# Patient Record
Sex: FEMALE | Race: WHITE | NOT HISPANIC OR LATINO | Employment: OTHER | URBAN - METROPOLITAN AREA
[De-identification: names, ages, dates, MRNs, and addresses within clinical notes are randomized per-mention and may not be internally consistent; named-entity substitution may affect disease eponyms.]

---

## 2021-03-08 ENCOUNTER — IMMUNIZATION (OUTPATIENT)
Dept: FAMILY MEDICINE | Facility: CLINIC | Age: 76
End: 2021-03-08
Payer: MEDICARE

## 2021-03-08 DIAGNOSIS — Z23 NEED FOR VACCINATION: Primary | ICD-10-CM

## 2021-03-08 PROCEDURE — 91301 COVID-19, MRNA, LNP-S, PF, 100 MCG/0.5 ML DOSE VACCINE: ICD-10-PCS | Mod: S$GLB,,, | Performed by: FAMILY MEDICINE

## 2021-03-08 PROCEDURE — 0011A COVID-19, MRNA, LNP-S, PF, 100 MCG/0.5 ML DOSE VACCINE: ICD-10-PCS | Mod: CV19,S$GLB,, | Performed by: FAMILY MEDICINE

## 2021-03-08 PROCEDURE — 0011A COVID-19, MRNA, LNP-S, PF, 100 MCG/0.5 ML DOSE VACCINE: CPT | Mod: CV19,S$GLB,, | Performed by: FAMILY MEDICINE

## 2021-03-08 PROCEDURE — 91301 COVID-19, MRNA, LNP-S, PF, 100 MCG/0.5 ML DOSE VACCINE: CPT | Mod: S$GLB,,, | Performed by: FAMILY MEDICINE

## 2021-04-05 ENCOUNTER — IMMUNIZATION (OUTPATIENT)
Dept: FAMILY MEDICINE | Facility: CLINIC | Age: 76
End: 2021-04-05
Payer: MEDICARE

## 2021-04-05 DIAGNOSIS — Z23 NEED FOR VACCINATION: Primary | ICD-10-CM

## 2021-04-05 PROCEDURE — 91301 COVID-19, MRNA, LNP-S, PF, 100 MCG/0.5 ML DOSE VACCINE: ICD-10-PCS | Mod: S$GLB,,, | Performed by: FAMILY MEDICINE

## 2021-04-05 PROCEDURE — 0012A COVID-19, MRNA, LNP-S, PF, 100 MCG/0.5 ML DOSE VACCINE: CPT | Mod: CV19,S$GLB,, | Performed by: FAMILY MEDICINE

## 2021-04-05 PROCEDURE — 0012A COVID-19, MRNA, LNP-S, PF, 100 MCG/0.5 ML DOSE VACCINE: ICD-10-PCS | Mod: CV19,S$GLB,, | Performed by: FAMILY MEDICINE

## 2021-04-05 PROCEDURE — 91301 COVID-19, MRNA, LNP-S, PF, 100 MCG/0.5 ML DOSE VACCINE: CPT | Mod: S$GLB,,, | Performed by: FAMILY MEDICINE

## 2021-11-17 NOTE — PROGRESS NOTES
"Chief Complaint   Patient presents with    Establish Care           Patient is new to clinic, here to establish care.   Current complaints/concerns:  *headache-   *memory issue- loses time, crying, waxing mood, more forgetful; worsening over the last 6-12 months  *doesn't live alone, lives with son and teenage granddaughter  *      Headache   This is a chronic problem. The current episode started more than 1 year ago. The pain is located in the occipital region. The quality of the pain is described as aching and throbbing. Associated symptoms include neck pain.         Review of patient's allergies indicates:   Allergen Reactions    Codeine Itching and Other (See Comments)    Pyrilamine-phenylephrin-dm tan Itching    Adhesive Other (See Comments)    Adhesive tape-silicones        No current outpatient medications on file prior to visit.     No current facility-administered medications on file prior to visit.       History reviewed. No pertinent past medical history.   Past Surgical History:   Procedure Laterality Date    bladder tacking      CHOLECYSTECTOMY      GASTRIC BYPASS      HYSTERECTOMY      TOTAL VAGINAL HYSTERECTOMY         Social History     Tobacco Use    Smoking status: Never Smoker    Smokeless tobacco: Never Used   Substance Use Topics    Alcohol use: Never    Drug use: Never        Family History   Problem Relation Age of Onset    Heart disease Mother     Diabetes Mother     Cancer Father         throat cancer        Review of Systems   Musculoskeletal: Positive for myalgias, neck pain and neck stiffness.   Neurological: Positive for headaches.   Psychiatric/Behavioral:        Memory issues        /84 (BP Location: Right arm, Patient Position: Sitting, BP Method: Medium (Automatic))   Pulse 75   Ht 5' 2" (1.575 m)   Wt 64.8 kg (142 lb 13.7 oz)   SpO2 95%   BMI 26.13 kg/m²     Physical Exam  Vitals and nursing note reviewed. Exam conducted with a chaperone present (daughter " of patient).   Constitutional:       Appearance: Normal appearance.   HENT:      Head: Normocephalic and atraumatic.   Eyes:      Conjunctiva/sclera: Conjunctivae normal.      Pupils: Pupils are equal, round, and reactive to light.   Cardiovascular:      Rate and Rhythm: Normal rate and regular rhythm.      Heart sounds: Normal heart sounds. No murmur heard.      Pulmonary:      Effort: Pulmonary effort is normal. No respiratory distress.      Breath sounds: Normal breath sounds and air entry. No stridor. No decreased breath sounds, wheezing, rhonchi or rales.   Musculoskeletal:      Cervical back: Spasms present. Decreased range of motion.        Back:    Neurological:      Mental Status: She is alert and oriented to person, place, and time.      Motor: Motor function is intact.      Coordination: Coordination is intact.      Gait: Gait is intact.   Psychiatric:         Attention and Perception: Attention and perception normal.         Mood and Affect: Mood normal. Affect is flat.         Speech: Speech normal.         Behavior: Behavior normal. Behavior is cooperative.         Thought Content: Thought content normal.         Judgment: Judgment normal.            Assessment and Plan (Medical Justification)      Yamilex was seen today for establish care.    Diagnoses and all orders for this visit:    Muscle spasm  -     methocarbamoL (ROBAXIN) 500 MG Tab; Take 1 tablet (500 mg total) by mouth 4 (four) times daily.  -     tiZANidine (ZANAFLEX) 2 MG tablet; One-half tablet to one whole tablet by mouth at bedtime    Memory difficulties  Comments:  unclear eitology         Follow up in about 4 weeks (around 12/16/2021) for New symptoms, Worsening symptoms.       Total time spent:  45 min    Available Notes, Procedures and Results, including Labs/Imaging, from the last 3 months were reviewed.    Risks, benefits, and side effects were discussed with the patient. All questions were answered to the fullest satisfaction of the  patient, and pt verbalized understanding and agreement to treatment plan. Pt was to call with any new or worsening symptoms, or present to the ER.    Patient instructed that best way to communicate with my office staff is for patient to get on the [facility] patient portal to expedite communication and communication issues that may occur.  Patient was given instructions on how to get on the portal.  I encouraged patient to obtain portal access as well.  Ultimately it is up to the patient to obtain access.  Patient voiced understanding.          Gabriele Cabrera M.D.  Family Medicine Physician  [facility]

## 2021-11-18 ENCOUNTER — OFFICE VISIT (OUTPATIENT)
Dept: FAMILY MEDICINE | Facility: CLINIC | Age: 76
End: 2021-11-18
Payer: MEDICARE

## 2021-11-18 VITALS
DIASTOLIC BLOOD PRESSURE: 84 MMHG | BODY MASS INDEX: 26.29 KG/M2 | HEART RATE: 75 BPM | OXYGEN SATURATION: 95 % | HEIGHT: 62 IN | WEIGHT: 142.88 LBS | SYSTOLIC BLOOD PRESSURE: 122 MMHG

## 2021-11-18 DIAGNOSIS — M62.838 MUSCLE SPASM: Primary | ICD-10-CM

## 2021-11-18 DIAGNOSIS — R41.3 MEMORY DIFFICULTIES: ICD-10-CM

## 2021-11-18 PROCEDURE — 3288F PR FALLS RISK ASSESSMENT DOCUMENTED: ICD-10-PCS | Mod: CPTII,S$GLB,, | Performed by: FAMILY MEDICINE

## 2021-11-18 PROCEDURE — 99204 OFFICE O/P NEW MOD 45 MIN: CPT | Mod: S$GLB,,, | Performed by: FAMILY MEDICINE

## 2021-11-18 PROCEDURE — 99999 PR PBB SHADOW E&M-EST. PATIENT-LVL III: ICD-10-PCS | Mod: PBBFAC,,, | Performed by: FAMILY MEDICINE

## 2021-11-18 PROCEDURE — 1101F PR PT FALLS ASSESS DOC 0-1 FALLS W/OUT INJ PAST YR: ICD-10-PCS | Mod: CPTII,S$GLB,, | Performed by: FAMILY MEDICINE

## 2021-11-18 PROCEDURE — 3008F BODY MASS INDEX DOCD: CPT | Mod: CPTII,S$GLB,, | Performed by: FAMILY MEDICINE

## 2021-11-18 PROCEDURE — 1126F PR PAIN SEVERITY QUANTIFIED, NO PAIN PRESENT: ICD-10-PCS | Mod: CPTII,S$GLB,, | Performed by: FAMILY MEDICINE

## 2021-11-18 PROCEDURE — 3074F SYST BP LT 130 MM HG: CPT | Mod: CPTII,S$GLB,, | Performed by: FAMILY MEDICINE

## 2021-11-18 PROCEDURE — 3008F PR BODY MASS INDEX (BMI) DOCUMENTED: ICD-10-PCS | Mod: CPTII,S$GLB,, | Performed by: FAMILY MEDICINE

## 2021-11-18 PROCEDURE — 3079F DIAST BP 80-89 MM HG: CPT | Mod: CPTII,S$GLB,, | Performed by: FAMILY MEDICINE

## 2021-11-18 PROCEDURE — 1160F PR REVIEW ALL MEDS BY PRESCRIBER/CLIN PHARMACIST DOCUMENTED: ICD-10-PCS | Mod: CPTII,S$GLB,, | Performed by: FAMILY MEDICINE

## 2021-11-18 PROCEDURE — 99204 PR OFFICE/OUTPT VISIT, NEW, LEVL IV, 45-59 MIN: ICD-10-PCS | Mod: S$GLB,,, | Performed by: FAMILY MEDICINE

## 2021-11-18 PROCEDURE — 99999 PR PBB SHADOW E&M-EST. PATIENT-LVL III: CPT | Mod: PBBFAC,,, | Performed by: FAMILY MEDICINE

## 2021-11-18 PROCEDURE — 3288F FALL RISK ASSESSMENT DOCD: CPT | Mod: CPTII,S$GLB,, | Performed by: FAMILY MEDICINE

## 2021-11-18 PROCEDURE — 1159F MED LIST DOCD IN RCRD: CPT | Mod: CPTII,S$GLB,, | Performed by: FAMILY MEDICINE

## 2021-11-18 PROCEDURE — 1159F PR MEDICATION LIST DOCUMENTED IN MEDICAL RECORD: ICD-10-PCS | Mod: CPTII,S$GLB,, | Performed by: FAMILY MEDICINE

## 2021-11-18 PROCEDURE — 3074F PR MOST RECENT SYSTOLIC BLOOD PRESSURE < 130 MM HG: ICD-10-PCS | Mod: CPTII,S$GLB,, | Performed by: FAMILY MEDICINE

## 2021-11-18 PROCEDURE — 1101F PT FALLS ASSESS-DOCD LE1/YR: CPT | Mod: CPTII,S$GLB,, | Performed by: FAMILY MEDICINE

## 2021-11-18 PROCEDURE — 3079F PR MOST RECENT DIASTOLIC BLOOD PRESSURE 80-89 MM HG: ICD-10-PCS | Mod: CPTII,S$GLB,, | Performed by: FAMILY MEDICINE

## 2021-11-18 PROCEDURE — 1160F RVW MEDS BY RX/DR IN RCRD: CPT | Mod: CPTII,S$GLB,, | Performed by: FAMILY MEDICINE

## 2021-11-18 PROCEDURE — 1126F AMNT PAIN NOTED NONE PRSNT: CPT | Mod: CPTII,S$GLB,, | Performed by: FAMILY MEDICINE

## 2021-11-18 RX ORDER — TIZANIDINE 2 MG/1
TABLET ORAL
Qty: 30 TABLET | Refills: 2 | Status: SHIPPED | OUTPATIENT
Start: 2021-11-18 | End: 2022-08-03

## 2021-11-18 RX ORDER — METHOCARBAMOL 500 MG/1
500 TABLET, FILM COATED ORAL 4 TIMES DAILY
Qty: 120 TABLET | Refills: 1 | Status: SHIPPED | OUTPATIENT
Start: 2021-11-18 | End: 2022-08-03

## 2021-11-24 DIAGNOSIS — Z12.11 COLON CANCER SCREENING: ICD-10-CM

## 2021-11-24 DIAGNOSIS — Z11.59 NEED FOR HEPATITIS C SCREENING TEST: ICD-10-CM

## 2021-12-02 DIAGNOSIS — Z12.31 OTHER SCREENING MAMMOGRAM: ICD-10-CM

## 2022-01-01 PROBLEM — M62.838 MUSCLE SPASM: Status: ACTIVE | Noted: 2022-01-01

## 2022-01-07 ENCOUNTER — TELEPHONE (OUTPATIENT)
Dept: FAMILY MEDICINE | Facility: CLINIC | Age: 77
End: 2022-01-07
Payer: MEDICARE

## 2022-01-07 DIAGNOSIS — R41.3 MEMORY DIFFICULTIES: Primary | ICD-10-CM

## 2022-01-07 NOTE — TELEPHONE ENCOUNTER
----- Message from Atilio Renteria sent at 1/7/2022  2:00 PM CST -----  Contact: Daughter/Kasia  Type: Needs Medical Advice  Who Called:  DaughterIsaias  Best Call Back Number: [phone]  Additional Information: States referral to Neuro discussed with Dr Cabrera for Dementia Screeening during last visit. Calling to follow up

## 2022-01-07 NOTE — TELEPHONE ENCOUNTER
Spoke to daughter. Dr Cabrera was supposed to give her a referral to neuro, but forgot. Daughter is desperate. Mother is combative, getting lost, etc.  Referral pended

## 2022-07-12 ENCOUNTER — TELEPHONE (OUTPATIENT)
Dept: PRIMARY CARE CLINIC | Facility: CLINIC | Age: 77
End: 2022-07-12
Payer: MEDICARE

## 2022-07-12 NOTE — TELEPHONE ENCOUNTER
----- Message from Dominga White, Patient Care Assistant sent at 7/8/2022  8:44 AM CDT -----  Regarding: same day  Contact: karlo white's daughter  Type:  Same Day Appointment Request    Caller is requesting a same day appointment.  Caller declined first available appointment listed below.      Name of Caller: karlo white's daughter   When is the first available appointment?  2022  Symptoms:  UTI  Best Call Back Number:  [phone] (home)     Additional Information: please call karlo white's daughter to advise. Thanks!

## 2022-08-03 ENCOUNTER — OFFICE VISIT (OUTPATIENT)
Dept: FAMILY MEDICINE | Facility: CLINIC | Age: 77
End: 2022-08-03
Payer: MEDICARE

## 2022-08-03 VITALS
OXYGEN SATURATION: 99 % | SYSTOLIC BLOOD PRESSURE: 128 MMHG | RESPIRATION RATE: 19 BRPM | HEIGHT: 62 IN | BODY MASS INDEX: 25.05 KG/M2 | DIASTOLIC BLOOD PRESSURE: 75 MMHG | WEIGHT: 136.13 LBS | TEMPERATURE: 99 F | HEART RATE: 85 BPM

## 2022-08-03 DIAGNOSIS — E53.8 VITAMIN B12 DEFICIENCY: Primary | ICD-10-CM

## 2022-08-03 DIAGNOSIS — R41.3 MEMORY DIFFICULTIES: ICD-10-CM

## 2022-08-03 PROCEDURE — 1126F PR PAIN SEVERITY QUANTIFIED, NO PAIN PRESENT: ICD-10-PCS | Mod: CPTII,S$GLB,, | Performed by: INTERNAL MEDICINE

## 2022-08-03 PROCEDURE — 99999 PR PBB SHADOW E&M-EST. PATIENT-LVL III: CPT | Mod: PBBFAC,,, | Performed by: INTERNAL MEDICINE

## 2022-08-03 PROCEDURE — 96372 THER/PROPH/DIAG INJ SC/IM: CPT | Mod: S$GLB,,, | Performed by: INTERNAL MEDICINE

## 2022-08-03 PROCEDURE — 99999 PR PBB SHADOW E&M-EST. PATIENT-LVL III: ICD-10-PCS | Mod: PBBFAC,,, | Performed by: INTERNAL MEDICINE

## 2022-08-03 PROCEDURE — 3078F DIAST BP <80 MM HG: CPT | Mod: CPTII,S$GLB,, | Performed by: INTERNAL MEDICINE

## 2022-08-03 PROCEDURE — 3074F PR MOST RECENT SYSTOLIC BLOOD PRESSURE < 130 MM HG: ICD-10-PCS | Mod: CPTII,S$GLB,, | Performed by: INTERNAL MEDICINE

## 2022-08-03 PROCEDURE — 1126F AMNT PAIN NOTED NONE PRSNT: CPT | Mod: CPTII,S$GLB,, | Performed by: INTERNAL MEDICINE

## 2022-08-03 PROCEDURE — 99213 OFFICE O/P EST LOW 20 MIN: CPT | Mod: 25,S$GLB,, | Performed by: INTERNAL MEDICINE

## 2022-08-03 PROCEDURE — 96372 PR INJECTION,THERAP/PROPH/DIAG2ST, IM OR SUBCUT: ICD-10-PCS | Mod: S$GLB,,, | Performed by: INTERNAL MEDICINE

## 2022-08-03 PROCEDURE — 3078F PR MOST RECENT DIASTOLIC BLOOD PRESSURE < 80 MM HG: ICD-10-PCS | Mod: CPTII,S$GLB,, | Performed by: INTERNAL MEDICINE

## 2022-08-03 PROCEDURE — 99213 PR OFFICE/OUTPT VISIT, EST, LEVL III, 20-29 MIN: ICD-10-PCS | Mod: 25,S$GLB,, | Performed by: INTERNAL MEDICINE

## 2022-08-03 PROCEDURE — 3074F SYST BP LT 130 MM HG: CPT | Mod: CPTII,S$GLB,, | Performed by: INTERNAL MEDICINE

## 2022-08-03 RX ORDER — RIVASTIGMINE 9.5 MG/24H
1 PATCH, EXTENDED RELEASE TRANSDERMAL
COMMUNITY
Start: 2022-01-18 | End: 2023-01-23

## 2022-08-03 RX ORDER — RISPERIDONE 0.5 MG/1
TABLET ORAL
COMMUNITY
End: 2023-01-23

## 2022-08-03 RX ORDER — PAROXETINE HYDROCHLORIDE HEMIHYDRATE 12.5 MG/1
12.5 TABLET, FILM COATED, EXTENDED RELEASE ORAL EVERY MORNING
Qty: 30 TABLET | Refills: 11 | Status: SHIPPED | OUTPATIENT
Start: 2022-08-03 | End: 2023-01-23

## 2022-08-03 RX ORDER — CYANOCOBALAMIN 1000 UG/ML
1000 INJECTION, SOLUTION INTRAMUSCULAR; SUBCUTANEOUS WEEKLY
Status: SHIPPED | OUTPATIENT
Start: 2022-08-03 | End: 2022-08-31

## 2022-08-03 RX ADMIN — CYANOCOBALAMIN 1000 MCG: 1000 INJECTION, SOLUTION INTRAMUSCULAR; SUBCUTANEOUS at 04:08

## 2022-08-03 NOTE — PROGRESS NOTES
Subjective:       Patient ID: Yamilex Gaytan is a 77 y.o. female.    Chief Complaint: Medication Problem    FU, Crying, Dementia,    Review of Systems   Constitutional: Negative for activity change, appetite change, diaphoresis, fatigue, fever and unexpected weight change.   HENT: Negative for nasal congestion, dental problem, ear discharge, ear pain, hearing loss, mouth dryness, postnasal drip, rhinorrhea, sinus pressure/congestion, sore throat and tinnitus.    Eyes: Negative for pain, redness and visual disturbance.   Respiratory: Negative for cough, choking, chest tightness, shortness of breath and wheezing.    Cardiovascular: Negative for chest pain, palpitations and leg swelling.   Gastrointestinal: Negative for abdominal distention, abdominal pain, blood in stool, nausea and reflux.   Endocrine: Negative for cold intolerance, heat intolerance and polyuria.   Genitourinary: Negative for bladder incontinence, dysuria, frequency, genital sores and hot flashes.   Musculoskeletal: Negative for back pain, joint swelling and neck pain.   Integumentary:  Negative for rash, mole/lesion and breast mass.   Allergic/Immunologic: Negative for food allergies.   Neurological: Negative for dizziness, tremors, seizures, weakness, headaches and memory loss.   Hematological: Negative for adenopathy.   Psychiatric/Behavioral: Negative for behavioral problems and suicidal ideas.   Breast: Negative for mass        Objective:      Physical Exam  Vitals and nursing note reviewed.   Constitutional:       Appearance: Normal appearance. She is normal weight.   HENT:      Head: Normocephalic and atraumatic.      Right Ear: Tympanic membrane, ear canal and external ear normal.      Left Ear: Tympanic membrane, ear canal and external ear normal.      Nose: Nose normal.      Mouth/Throat:      Mouth: Mucous membranes are moist.      Pharynx: Oropharynx is clear.   Eyes:      Extraocular Movements: Extraocular movements intact.       Conjunctiva/sclera: Conjunctivae normal.      Pupils: Pupils are equal, round, and reactive to light.   Cardiovascular:      Rate and Rhythm: Normal rate and regular rhythm.      Pulses: Normal pulses.      Heart sounds: Normal heart sounds.   Pulmonary:      Effort: Pulmonary effort is normal.      Breath sounds: Normal breath sounds.   Abdominal:      General: Abdomen is flat. Bowel sounds are normal.      Palpations: Abdomen is soft.   Musculoskeletal:         General: Normal range of motion.      Cervical back: Normal range of motion and neck supple.   Skin:     General: Skin is warm.      Capillary Refill: Capillary refill takes less than 2 seconds.   Neurological:      General: No focal deficit present.      Mental Status: She is alert and oriented to person, place, and time. Mental status is at baseline.   Psychiatric:         Mood and Affect: Mood normal.         Judgment: Judgment normal.         Assessment/Plan:     Dementia- Minimally communication. Crying.  Add Paxil 12.5mg, B12,Continue Resperidol, Excelon,  Home health consult placed.  Problem List Items Addressed This Visit    None          MDM:         No problem-specific Assessment & Plan notes found for this encounter.

## 2022-08-03 NOTE — PROGRESS NOTES
Patient is here to be seen about a medication change. Daughter states the patient has dementia and the medication she is on is making her an emotional wreck. Patients daughter also states she would like to check into having some in home care for her mother.

## 2022-08-04 ENCOUNTER — LAB VISIT (OUTPATIENT)
Dept: FAMILY MEDICINE | Facility: CLINIC | Age: 77
End: 2022-08-04
Payer: MEDICARE

## 2022-08-04 DIAGNOSIS — R41.3 MEMORY DIFFICULTIES: ICD-10-CM

## 2022-08-04 LAB — TSH SERPL DL<=0.005 MIU/L-ACNC: 2.49 UIU/ML (ref 0.4–4)

## 2022-08-04 PROCEDURE — 86038 ANTINUCLEAR ANTIBODIES: CPT | Performed by: INTERNAL MEDICINE

## 2022-08-04 PROCEDURE — 86235 NUCLEAR ANTIGEN ANTIBODY: CPT | Mod: 59 | Performed by: INTERNAL MEDICINE

## 2022-08-04 PROCEDURE — 84443 ASSAY THYROID STIM HORMONE: CPT | Performed by: INTERNAL MEDICINE

## 2022-08-04 PROCEDURE — 86039 ANTINUCLEAR ANTIBODIES (ANA): CPT | Performed by: INTERNAL MEDICINE

## 2022-08-04 PROCEDURE — 82746 ASSAY OF FOLIC ACID SERUM: CPT | Performed by: INTERNAL MEDICINE

## 2022-08-04 PROCEDURE — 86592 SYPHILIS TEST NON-TREP QUAL: CPT | Performed by: INTERNAL MEDICINE

## 2022-08-05 LAB
ANA PATTERN 1: NORMAL
ANA PATTERN 2: NORMAL
ANA SER QL IF: POSITIVE
ANA TITER 2: NORMAL
ANA TITR SER IF: NORMAL {TITER}
FOLATE SERPL-MCNC: 5.9 NG/ML (ref 4–24)
RPR SER QL: NORMAL

## 2022-08-09 LAB
ANTI SM ANTIBODY: 0.07 RATIO (ref 0–0.99)
ANTI SM/RNP ANTIBODY: 0.05 RATIO (ref 0–0.99)
ANTI-SM INTERPRETATION: NEGATIVE
ANTI-SM/RNP INTERPRETATION: NEGATIVE
ANTI-SSA ANTIBODY: 0.06 RATIO (ref 0–0.99)
ANTI-SSA INTERPRETATION: NEGATIVE
ANTI-SSB ANTIBODY: 0.04 RATIO (ref 0–0.99)
ANTI-SSB INTERPRETATION: NEGATIVE
DSDNA AB SER-ACNC: NORMAL [IU]/ML

## 2022-09-14 DIAGNOSIS — Z78.0 MENOPAUSE: ICD-10-CM

## 2023-01-20 ENCOUNTER — TELEPHONE (OUTPATIENT)
Dept: FAMILY MEDICINE | Facility: CLINIC | Age: 78
End: 2023-01-20
Payer: MEDICARE

## 2023-01-20 NOTE — TELEPHONE ENCOUNTER
----- Message from Miranda Rojas sent at 1/20/2023  8:57 AM CST -----  Who Called: Daughter Ed Pedroza    What is the reqeust in detail: Requesting call back to discuss mothers severe edema and the possibility of having a sooner available appointment. Patient was taken to the ER and will need an ER follow up. Please advise.     Can the clinic reply by [facility]? No    Best Call Back Number: [phone] daughter      Additional Information: Patient is only open to seeing Dr. Swann.

## 2023-01-20 NOTE — TELEPHONE ENCOUNTER
Spoke with patient's daughter karlo and scheduled patient a hospital f/u for 1/23/2023 at 11:20. Patient verbalized understanding.

## 2023-01-23 ENCOUNTER — OFFICE VISIT (OUTPATIENT)
Dept: FAMILY MEDICINE | Facility: CLINIC | Age: 78
End: 2023-01-23
Payer: MEDICARE

## 2023-01-23 ENCOUNTER — LAB VISIT (OUTPATIENT)
Dept: FAMILY MEDICINE | Facility: CLINIC | Age: 78
End: 2023-01-23
Payer: MEDICARE

## 2023-01-23 VITALS
HEIGHT: 62 IN | BODY MASS INDEX: 29.05 KG/M2 | SYSTOLIC BLOOD PRESSURE: 120 MMHG | DIASTOLIC BLOOD PRESSURE: 70 MMHG | WEIGHT: 157.88 LBS | OXYGEN SATURATION: 98 % | HEART RATE: 87 BPM | TEMPERATURE: 97 F

## 2023-01-23 DIAGNOSIS — R41.3 MEMORY DIFFICULTIES: ICD-10-CM

## 2023-01-23 DIAGNOSIS — Z79.899 LONG-TERM USE OF HIGH-RISK MEDICATION: ICD-10-CM

## 2023-01-23 DIAGNOSIS — Z76.89 ESTABLISHING CARE WITH NEW DOCTOR, ENCOUNTER FOR: ICD-10-CM

## 2023-01-23 DIAGNOSIS — F41.9 ANXIETY: ICD-10-CM

## 2023-01-23 DIAGNOSIS — E53.8 VITAMIN B12 DEFICIENCY: ICD-10-CM

## 2023-01-23 DIAGNOSIS — R60.9 DEPENDENT EDEMA: ICD-10-CM

## 2023-01-23 DIAGNOSIS — R79.89 ELEVATED BRAIN NATRIURETIC PEPTIDE (BNP) LEVEL: ICD-10-CM

## 2023-01-23 DIAGNOSIS — R74.8 ABNORMAL LEVELS OF OTHER SERUM ENZYMES: ICD-10-CM

## 2023-01-23 DIAGNOSIS — R41.3 MEMORY DIFFICULTIES: Primary | ICD-10-CM

## 2023-01-23 DIAGNOSIS — R79.9 ABNORMAL FINDING OF BLOOD CHEMISTRY, UNSPECIFIED: ICD-10-CM

## 2023-01-23 DIAGNOSIS — Z98.84 HISTORY OF ROUX-EN-Y GASTRIC BYPASS: ICD-10-CM

## 2023-01-23 LAB
ALBUMIN SERPL BCP-MCNC: 3.5 G/DL (ref 3.5–5.2)
ALP SERPL-CCNC: 76 U/L (ref 55–135)
ALT SERPL W/O P-5'-P-CCNC: 17 U/L (ref 10–44)
ANION GAP SERPL CALC-SCNC: 10 MMOL/L (ref 8–16)
AST SERPL-CCNC: 19 U/L (ref 10–40)
BASOPHILS # BLD AUTO: 0.05 K/UL (ref 0–0.2)
BASOPHILS NFR BLD: 0.7 % (ref 0–1.9)
BILIRUB SERPL-MCNC: 0.4 MG/DL (ref 0.1–1)
BILIRUB UR QL STRIP: NEGATIVE
BNP SERPL-MCNC: 245 PG/ML (ref 0–99)
BUN SERPL-MCNC: 12 MG/DL (ref 8–23)
CALCIUM SERPL-MCNC: 9.3 MG/DL (ref 8.7–10.5)
CHLORIDE SERPL-SCNC: 105 MMOL/L (ref 95–110)
CLARITY UR: CLEAR
CO2 SERPL-SCNC: 27 MMOL/L (ref 23–29)
COLOR UR: YELLOW
CREAT SERPL-MCNC: 0.7 MG/DL (ref 0.5–1.4)
DIFFERENTIAL METHOD: ABNORMAL
EOSINOPHIL # BLD AUTO: 0.1 K/UL (ref 0–0.5)
EOSINOPHIL NFR BLD: 1.9 % (ref 0–8)
ERYTHROCYTE [DISTWIDTH] IN BLOOD BY AUTOMATED COUNT: 13.5 % (ref 11.5–14.5)
EST. GFR  (NO RACE VARIABLE): >60 ML/MIN/1.73 M^2
GLUCOSE SERPL-MCNC: 80 MG/DL (ref 70–110)
GLUCOSE UR QL STRIP: NEGATIVE
HCT VFR BLD AUTO: 40.6 % (ref 37–48.5)
HGB BLD-MCNC: 12.9 G/DL (ref 12–16)
HGB UR QL STRIP: NEGATIVE
IMM GRANULOCYTES # BLD AUTO: 0.01 K/UL (ref 0–0.04)
IMM GRANULOCYTES NFR BLD AUTO: 0.1 % (ref 0–0.5)
IRON SERPL-MCNC: 73 UG/DL (ref 30–160)
KETONES UR QL STRIP: NEGATIVE
LEUKOCYTE ESTERASE UR QL STRIP: NEGATIVE
LYMPHOCYTES # BLD AUTO: 1.6 K/UL (ref 1–4.8)
LYMPHOCYTES NFR BLD: 23.7 % (ref 18–48)
MAGNESIUM SERPL-MCNC: 2 MG/DL (ref 1.6–2.6)
MCH RBC QN AUTO: 31.7 PG (ref 27–31)
MCHC RBC AUTO-ENTMCNC: 31.8 G/DL (ref 32–36)
MCV RBC AUTO: 100 FL (ref 82–98)
MONOCYTES # BLD AUTO: 0.8 K/UL (ref 0.3–1)
MONOCYTES NFR BLD: 11.1 % (ref 4–15)
NEUTROPHILS # BLD AUTO: 4.3 K/UL (ref 1.8–7.7)
NEUTROPHILS NFR BLD: 62.5 % (ref 38–73)
NITRITE UR QL STRIP: NEGATIVE
NRBC BLD-RTO: 0 /100 WBC
PH UR STRIP: 7 [PH] (ref 5–8)
PLATELET # BLD AUTO: 324 K/UL (ref 150–450)
PMV BLD AUTO: 9.6 FL (ref 9.2–12.9)
POTASSIUM SERPL-SCNC: 4.2 MMOL/L (ref 3.5–5.1)
PROT SERPL-MCNC: 7 G/DL (ref 6–8.4)
PROT UR QL STRIP: NEGATIVE
RBC # BLD AUTO: 4.07 M/UL (ref 4–5.4)
SATURATED IRON: 21 % (ref 20–50)
SODIUM SERPL-SCNC: 142 MMOL/L (ref 136–145)
SP GR UR STRIP: 1.02 (ref 1–1.03)
T4 FREE SERPL-MCNC: 0.83 NG/DL (ref 0.71–1.51)
TOTAL IRON BINDING CAPACITY: 352 UG/DL (ref 250–450)
TRANSFERRIN SERPL-MCNC: 238 MG/DL (ref 200–375)
TSH SERPL DL<=0.005 MIU/L-ACNC: 3.27 UIU/ML (ref 0.4–4)
URN SPEC COLLECT METH UR: NORMAL
UROBILINOGEN UR STRIP-ACNC: 1 EU/DL
WBC # BLD AUTO: 6.83 K/UL (ref 3.9–12.7)

## 2023-01-23 PROCEDURE — 1160F PR REVIEW ALL MEDS BY PRESCRIBER/CLIN PHARMACIST DOCUMENTED: ICD-10-PCS | Mod: CPTII,S$GLB,, | Performed by: FAMILY MEDICINE

## 2023-01-23 PROCEDURE — 3078F PR MOST RECENT DIASTOLIC BLOOD PRESSURE < 80 MM HG: ICD-10-PCS | Mod: CPTII,S$GLB,, | Performed by: FAMILY MEDICINE

## 2023-01-23 PROCEDURE — 82746 ASSAY OF FOLIC ACID SERUM: CPT | Performed by: FAMILY MEDICINE

## 2023-01-23 PROCEDURE — 1159F PR MEDICATION LIST DOCUMENTED IN MEDICAL RECORD: ICD-10-PCS | Mod: CPTII,S$GLB,, | Performed by: FAMILY MEDICINE

## 2023-01-23 PROCEDURE — 1159F MED LIST DOCD IN RCRD: CPT | Mod: CPTII,S$GLB,, | Performed by: FAMILY MEDICINE

## 2023-01-23 PROCEDURE — 83735 ASSAY OF MAGNESIUM: CPT | Performed by: FAMILY MEDICINE

## 2023-01-23 PROCEDURE — 3074F SYST BP LT 130 MM HG: CPT | Mod: CPTII,S$GLB,, | Performed by: FAMILY MEDICINE

## 2023-01-23 PROCEDURE — 84466 ASSAY OF TRANSFERRIN: CPT | Performed by: FAMILY MEDICINE

## 2023-01-23 PROCEDURE — 1101F PT FALLS ASSESS-DOCD LE1/YR: CPT | Mod: CPTII,S$GLB,, | Performed by: FAMILY MEDICINE

## 2023-01-23 PROCEDURE — 99215 OFFICE O/P EST HI 40 MIN: CPT | Mod: S$GLB,,, | Performed by: FAMILY MEDICINE

## 2023-01-23 PROCEDURE — 80053 COMPREHEN METABOLIC PANEL: CPT | Performed by: FAMILY MEDICINE

## 2023-01-23 PROCEDURE — 99215 PR OFFICE/OUTPT VISIT, EST, LEVL V, 40-54 MIN: ICD-10-PCS | Mod: S$GLB,,, | Performed by: FAMILY MEDICINE

## 2023-01-23 PROCEDURE — 82306 VITAMIN D 25 HYDROXY: CPT | Performed by: FAMILY MEDICINE

## 2023-01-23 PROCEDURE — 1101F PR PT FALLS ASSESS DOC 0-1 FALLS W/OUT INJ PAST YR: ICD-10-PCS | Mod: CPTII,S$GLB,, | Performed by: FAMILY MEDICINE

## 2023-01-23 PROCEDURE — 1160F RVW MEDS BY RX/DR IN RCRD: CPT | Mod: CPTII,S$GLB,, | Performed by: FAMILY MEDICINE

## 2023-01-23 PROCEDURE — 84207 ASSAY OF VITAMIN B-6: CPT | Performed by: FAMILY MEDICINE

## 2023-01-23 PROCEDURE — 83880 ASSAY OF NATRIURETIC PEPTIDE: CPT | Performed by: FAMILY MEDICINE

## 2023-01-23 PROCEDURE — 84252 ASSAY OF VITAMIN B-2: CPT | Performed by: FAMILY MEDICINE

## 2023-01-23 PROCEDURE — 85025 COMPLETE CBC W/AUTO DIFF WBC: CPT | Performed by: FAMILY MEDICINE

## 2023-01-23 PROCEDURE — 82607 VITAMIN B-12: CPT | Performed by: FAMILY MEDICINE

## 2023-01-23 PROCEDURE — 99999 PR PBB SHADOW E&M-EST. PATIENT-LVL III: CPT | Mod: PBBFAC,,, | Performed by: FAMILY MEDICINE

## 2023-01-23 PROCEDURE — 84439 ASSAY OF FREE THYROXINE: CPT | Performed by: FAMILY MEDICINE

## 2023-01-23 PROCEDURE — 3074F PR MOST RECENT SYSTOLIC BLOOD PRESSURE < 130 MM HG: ICD-10-PCS | Mod: CPTII,S$GLB,, | Performed by: FAMILY MEDICINE

## 2023-01-23 PROCEDURE — 1125F PR PAIN SEVERITY QUANTIFIED, PAIN PRESENT: ICD-10-PCS | Mod: CPTII,S$GLB,, | Performed by: FAMILY MEDICINE

## 2023-01-23 PROCEDURE — 3288F PR FALLS RISK ASSESSMENT DOCUMENTED: ICD-10-PCS | Mod: CPTII,S$GLB,, | Performed by: FAMILY MEDICINE

## 2023-01-23 PROCEDURE — 81003 URINALYSIS AUTO W/O SCOPE: CPT | Performed by: FAMILY MEDICINE

## 2023-01-23 PROCEDURE — 3078F DIAST BP <80 MM HG: CPT | Mod: CPTII,S$GLB,, | Performed by: FAMILY MEDICINE

## 2023-01-23 PROCEDURE — 99999 PR PBB SHADOW E&M-EST. PATIENT-LVL III: ICD-10-PCS | Mod: PBBFAC,,, | Performed by: FAMILY MEDICINE

## 2023-01-23 PROCEDURE — 84425 ASSAY OF VITAMIN B-1: CPT | Performed by: FAMILY MEDICINE

## 2023-01-23 PROCEDURE — 3288F FALL RISK ASSESSMENT DOCD: CPT | Mod: CPTII,S$GLB,, | Performed by: FAMILY MEDICINE

## 2023-01-23 PROCEDURE — 1125F AMNT PAIN NOTED PAIN PRSNT: CPT | Mod: CPTII,S$GLB,, | Performed by: FAMILY MEDICINE

## 2023-01-23 PROCEDURE — 84443 ASSAY THYROID STIM HORMONE: CPT | Performed by: FAMILY MEDICINE

## 2023-01-23 PROCEDURE — 82728 ASSAY OF FERRITIN: CPT | Performed by: FAMILY MEDICINE

## 2023-01-23 RX ORDER — FUROSEMIDE 20 MG/1
TABLET ORAL
Qty: 180 TABLET | Refills: 0 | Status: SHIPPED | OUTPATIENT
Start: 2023-01-23 | End: 2023-04-05

## 2023-01-23 RX ORDER — POTASSIUM CHLORIDE 20 MEQ/1
TABLET, EXTENDED RELEASE ORAL
Qty: 90 TABLET | Refills: 0 | Status: SHIPPED | OUTPATIENT
Start: 2023-01-23 | End: 2023-04-19

## 2023-01-23 RX ORDER — PAROXETINE 10 MG/1
10 TABLET, FILM COATED ORAL EVERY MORNING
Qty: 90 TABLET | Refills: 3 | Status: SHIPPED | OUTPATIENT
Start: 2023-01-23 | End: 2024-01-23

## 2023-01-23 RX ORDER — RIVASTIGMINE 4.6 MG/24H
1 PATCH, EXTENDED RELEASE TRANSDERMAL DAILY
Qty: 90 PATCH | Refills: 1 | Status: SHIPPED | OUTPATIENT
Start: 2023-01-23 | End: 2023-02-24

## 2023-01-23 RX ORDER — FUROSEMIDE 20 MG/1
20 TABLET ORAL DAILY
COMMUNITY
Start: 2023-01-16 | End: 2023-01-23 | Stop reason: SDUPTHER

## 2023-01-23 RX ORDER — RIVASTIGMINE 4.6 MG/24H
1 PATCH, EXTENDED RELEASE TRANSDERMAL DAILY
Qty: 30 PATCH | Refills: 5 | Status: SHIPPED | OUTPATIENT
Start: 2023-01-23 | End: 2023-01-23

## 2023-01-23 RX ORDER — PAROXETINE HYDROCHLORIDE HEMIHYDRATE 12.5 MG/1
12.5 TABLET, FILM COATED, EXTENDED RELEASE ORAL EVERY MORNING
Qty: 90 TABLET | Refills: 1 | Status: SHIPPED | OUTPATIENT
Start: 2023-01-23 | End: 2023-01-23

## 2023-01-23 NOTE — PROGRESS NOTES
Subjective:       Patient ID: Yamilex Gaytan is a 78 y.o. female.    Chief Complaint: Follow-up (Edema, ulcerations started on Lasix 20mg daily, then blisters, had wound care HH(will not keep dressings on),wound care appointment at [facility] on Friday,review medicartion)    Patient with recent hospitalization vs ER visit at [facility]. Limited records available for review (D/C papers.) With weeping edema and persistent leg swelling. Per family, pt had neg cardiac workup and was told the swelling was due to sitting all day with feet hanging down. Patient noncompliant with compression socks or wound care (wraps, Unna boot?) Has home health consulted from hospital.    Depression Patient Health Questionnaire 1/23/2023 8/3/2022 11/18/2021   Over the last two weeks how often have you been bothered by little interest or pleasure in doing things Not at all Not at all Not at all   Over the last two weeks how often have you been bothered by feeling down, depressed or hopeless Not at all Not at all Not at all   PHQ-2 Total Score 0 0 0     No flowsheet data found.    Review of Systems   All other systems reviewed and are negative.      Past Medical History:   Diagnosis Date    Dementia      Past Surgical History:   Procedure Laterality Date    ADENOIDECTOMY      bladder tacking      CHOLECYSTECTOMY      GASTRIC BYPASS      HYSTERECTOMY      TOTAL VAGINAL HYSTERECTOMY       Family History   Problem Relation Age of Onset    Heart disease Mother     Diabetes Mother     Cancer Father         throat cancer       Review of patient's allergies indicates:   Allergen Reactions    Codeine Itching and Other (See Comments)    Pyrilamine-phenylephrin-dm tan Itching    Adhesive Other (See Comments)    Adhesive tape-silicones        Current Outpatient Medications:     furosemide (LASIX) 20 MG tablet, Take 2 tablets twice daily for 2 days, then take 2 tablets once daily for swelling. Adjust the dose as instructed., Disp: 180 tablet, Rfl: 0     "UNABLE TO FIND, medication name: CBD Gummy, Disp: , Rfl:     paroxetine (PAXIL) 10 MG tablet, Take 1 tablet (10 mg total) by mouth every morning., Disp: 90 tablet, Rfl: 3    potassium chloride SA (K-DUR,KLOR-CON) 20 MEQ tablet, Take once daily if needed for leg cramping, Disp: 90 tablet, Rfl: 0    rivastigmine (EXELON PATCH) 4.6 mg/24 hour PT24, Place 1 patch onto the skin once daily., Disp: 90 patch, Rfl: 1      Objective:      /70 (BP Location: Right arm, Patient Position: Sitting, BP Method: Medium (Manual))   Pulse 87   Temp 97 °F (36.1 °C) (Tympanic)   Ht 5' 2" (1.575 m)   Wt 71.6 kg (157 lb 13.6 oz)   SpO2 98%   BMI 28.87 kg/m²   Physical Exam  Vitals and nursing note reviewed. Exam conducted with a chaperone present (daughter of patient).   Constitutional:       General: She is not in acute distress.     Appearance: Normal appearance. She is normal weight. She is not toxic-appearing or diaphoretic.   HENT:      Head: Normocephalic and atraumatic.      Mouth/Throat:      Mouth: Mucous membranes are dry.   Eyes:      General: No scleral icterus.        Right eye: No discharge.         Left eye: No discharge.      Conjunctiva/sclera: Conjunctivae normal.   Cardiovascular:      Rate and Rhythm: Normal rate and regular rhythm.      Heart sounds: Normal heart sounds. No murmur heard.  Pulmonary:      Effort: Pulmonary effort is normal. No respiratory distress.      Breath sounds: Normal breath sounds and air entry. No stridor. No decreased breath sounds, wheezing, rhonchi or rales.   Abdominal:      General: There is no distension.   Musculoskeletal:      Cervical back: Neck supple. Spasms present. Decreased range of motion.        Back:       Right lower leg: Edema present.      Left lower leg: Edema present.   Skin:     Findings: Rash (stasis dermatitis) present.      Comments: Stasis ulcerations on bilat lower legs, also with weeping edema bilaterally.   Neurological:      General: No focal deficit " present.      Mental Status: She is alert and oriented to person, place, and time.      Motor: Motor function is intact.      Coordination: Coordination is intact.      Gait: Gait is intact.   Psychiatric:         Attention and Perception: Attention and perception normal.         Mood and Affect: Mood normal. Affect is flat.         Speech: Speech normal.         Behavior: Behavior normal. Behavior is cooperative.         Thought Content: Thought content normal.       Assessment:       1. Memory difficulties    2. Vitamin B12 deficiency    3. Anxiety    4. History of Sandie-en-Y gastric bypass    5. Dependent edema    6. Elevated brain natriuretic peptide (BNP) level    7. Long-term use of high-risk medication    8. Abnormal levels of other serum enzymes    9. Abnormal finding of blood chemistry, unspecified    10. Establishing care with new doctor, encounter for        Plan:       Memory difficulties  -     rivastigmine (EXELON PATCH) 4.6 mg/24 hour PT24; Place 1 patch onto the skin once daily.  Dispense: 90 patch; Refill: 1  -     Vitamin B1; Future; Expected date: 01/23/2023  -     Ferritin; Future; Expected date: 01/23/2023  -     Iron and TIBC; Future; Expected date: 01/23/2023  -     Vitamin B2; Future; Expected date: 01/23/2023  -     Vitamin B6; Future; Expected date: 01/23/2023  -     Vitamin B12; Future; Expected date: 01/23/2023  -     Magnesium; Future; Expected date: 01/23/2023  -     T4, Free; Future; Expected date: 01/23/2023  -     TSH; Future; Expected date: 01/23/2023  -     Folate; Future; Expected date: 01/23/2023    Vitamin B12 deficiency  -     Vitamin B1; Future; Expected date: 01/23/2023  -     Ferritin; Future; Expected date: 01/23/2023  -     Iron and TIBC; Future; Expected date: 01/23/2023  -     CBC Auto Differential; Future; Expected date: 01/23/2023  -     Vitamin B2; Future; Expected date: 01/23/2023  -     Vitamin B6; Future; Expected date: 01/23/2023  -     Vitamin B12; Future;  Expected date: 01/23/2023  -     Folate; Future; Expected date: 01/23/2023    Anxiety  -     paroxetine (PAXIL) 10 MG tablet; Take 1 tablet (10 mg total) by mouth every morning.  Dispense: 90 tablet; Refill: 3    History of Sandie-en-Y gastric bypass  -     Vitamin B1; Future; Expected date: 01/23/2023  -     Ferritin; Future; Expected date: 01/23/2023  -     Iron and TIBC; Future; Expected date: 01/23/2023  -     CBC Auto Differential; Future; Expected date: 01/23/2023  -     Comprehensive Metabolic Panel; Future; Expected date: 01/23/2023  -     Vitamin B2; Future; Expected date: 01/23/2023  -     Vitamin B6; Future; Expected date: 01/23/2023  -     Vitamin D; Future; Expected date: 01/23/2023  -     Vitamin B12; Future; Expected date: 01/23/2023  -     Magnesium; Future; Expected date: 01/23/2023  -     T4, Free; Future; Expected date: 01/23/2023  -     TSH; Future; Expected date: 01/23/2023  -     Urinalysis, Reflex to Urine Culture Urine, Clean Catch; Future; Expected date: 01/23/2023  -     Folate; Future; Expected date: 01/23/2023    Dependent edema  -     furosemide (LASIX) 20 MG tablet; Take 2 tablets twice daily for 2 days, then take 2 tablets once daily for swelling. Adjust the dose as instructed.  Dispense: 180 tablet; Refill: 0  -     potassium chloride SA (K-DUR,KLOR-CON) 20 MEQ tablet; Take once daily if needed for leg cramping  Dispense: 90 tablet; Refill: 0    Elevated brain natriuretic peptide (BNP) level  -     BNP; Future; Expected date: 01/23/2023    Long-term use of high-risk medication  -     Vitamin B1; Future; Expected date: 01/23/2023  -     Ferritin; Future; Expected date: 01/23/2023  -     Iron and TIBC; Future; Expected date: 01/23/2023  -     CBC Auto Differential; Future; Expected date: 01/23/2023  -     Comprehensive Metabolic Panel; Future; Expected date: 01/23/2023  -     Vitamin B2; Future; Expected date: 01/23/2023  -     Vitamin B6; Future; Expected date: 01/23/2023  -     Vitamin  D; Future; Expected date: 01/23/2023  -     Vitamin B12; Future; Expected date: 01/23/2023  -     Magnesium; Future; Expected date: 01/23/2023  -     T4, Free; Future; Expected date: 01/23/2023  -     TSH; Future; Expected date: 01/23/2023  -     Urinalysis, Reflex to Urine Culture Urine, Clean Catch; Future; Expected date: 01/23/2023  -     Folate; Future; Expected date: 01/23/2023    Abnormal levels of other serum enzymes  -     Vitamin B6; Future; Expected date: 01/23/2023    Abnormal finding of blood chemistry, unspecified  -     Ferritin; Future; Expected date: 01/23/2023  -     Iron and TIBC; Future; Expected date: 01/23/2023    Establishing care with new doctor, encounter for    Other orders  -     Discontinue: rivastigmine (EXELON PATCH) 4.6 mg/24 hour PT24; Place 1 patch onto the skin once daily.  Dispense: 30 patch; Refill: 5  -     Discontinue: paroxetine (PAXIL CR) 12.5 MG 24 hr tablet; Take 1 tablet (12.5 mg total) by mouth every morning.  Dispense: 90 tablet; Refill: 1            Restart Exelon patch at 4.6mg daily.  D/C Paxil CR due to hx gastrci bypass. Change to paxil 10mg, and after 4-06 week plan to inc to 20mg.  Consult wound care with home health for evaluation of Unna boot (ABIs first.)      Previous records in Epic were reviewed, including the last 3 months of encounters, imaging, laboratory, and pathology reports.    Strict return precautions reviewed and patient verbalized understanding. Risks, benefits, and alternatives to the plan were reviewed in detail and all questions answered to the patient's satisfaction. Patient agrees to return to clinic or ER if symptoms worsen. 40 minutes total were spent on today's visit, not limited to but including time based on counseling and coordination of care.    Patient instructed that best way to communicate with my office staff is for patient to get on the [facility] patient portal to expedite communication and communication issues that may occur.   Patient was given instructions on how to get on the portal.  I encouraged patient to obtain portal access as well.  Ultimately it is up to the patient to obtain access.  Patient voiced understanding.    This note was created using [employer] voice recognition software that occasionally may misinterpret phrases or words.    Follow up in about 2 weeks (around 2/6/2023) for f/u leg swelling, lab results.

## 2023-01-24 LAB
25(OH)D3+25(OH)D2 SERPL-MCNC: 6 NG/ML (ref 30–96)
FERRITIN SERPL-MCNC: 74 NG/ML (ref 20–300)
FOLATE SERPL-MCNC: 7.1 NG/ML (ref 4–24)
VIT B12 SERPL-MCNC: 188 PG/ML (ref 210–950)

## 2023-01-26 LAB — VIT B2 SERPL-MCNC: 12 MCG/L (ref 1–19)

## 2023-01-27 LAB
PYRIDOXAL SERPL-MCNC: 6 UG/L (ref 5–50)
VIT B1 BLD-MCNC: 66 UG/L (ref 38–122)

## 2023-01-31 ENCOUNTER — TELEPHONE (OUTPATIENT)
Dept: FAMILY MEDICINE | Facility: CLINIC | Age: 78
End: 2023-01-31

## 2023-01-31 DIAGNOSIS — I83.009 CHRONIC CUTANEOUS VENOUS STASIS ULCER: ICD-10-CM

## 2023-01-31 DIAGNOSIS — I87.8 VENOUS STASIS SYNDROME: ICD-10-CM

## 2023-01-31 DIAGNOSIS — L97.909 CHRONIC CUTANEOUS VENOUS STASIS ULCER: ICD-10-CM

## 2023-01-31 DIAGNOSIS — R60.9 DEPENDENT EDEMA: Primary | ICD-10-CM

## 2023-01-31 NOTE — TELEPHONE ENCOUNTER
Call placed to Kecia Dominguez/[facility] due to Dr. Sánchez's  asking can updated wo      Lisseth Sánchez,    Please review message below from nurse Kecia Dominguez from [facility] requesting new wound care instructions for this patient.  Thank you  Signed:  Ashley Hayes LPN  Call placed to Kecia Dominguez/[facility] due to message left, currently not available message left for return call.    ----- Message from Dina House sent at 1/31/2023  8:38 AM CST -----  Who Called:nurse kecia dominguez       What is the reqeust in detail: pt is requesting for new wound care instructions to be sent via fax [phone] , so she can tend to the pt today at 10am . Please advise       Can the clinic reply by [facility]? No       Best Call Back Number:[phone]      Additional Information:

## 2023-01-31 NOTE — TELEPHONE ENCOUNTER
Recommend patient be evaluated for Unna boot, she will need ABIs before proceeding. The Unna boot can be left on for up to 7 days, or changed twice weekly.    When I consult the wound care nurse for home health, typically have recommendations from the nurse and go from there. Not sure what they have been doing up until now, or if today was her first visit with patient.    Message forwarded tome at 16:46 on 1/31/23. Will send back to phone nurseto address first thing tomorrow morning.

## 2023-02-01 NOTE — TELEPHONE ENCOUNTER
Lisseth Sánchez,    Please review message below.  Call placed to [facility] spoke with Effie Leyva, states requesting the orders for this patient include: Have a Wound Care Specialist Nurse evaluate for Unna boot and do a LAQUITA's. Purpose for this patient can get ultimate care. States the Wound Care Specialist Nurse will work with the home health nurse to show her the best care for this patient.   Orders were given but Effie Leyva with [facility] requested additional treatment care for this patient. Requesting orders to fax to them. Fax number [phone]. Attn: Effie Leyva or Magali caballero/[facility].  Signed:   Ashley Hayes LPN        Recommend patient be evaluated for Unna boot, she will need ABIs before proceeding. The Unna boot can be left on for up to 7 days, or changed twice weekly.     When I consult the wound care nurse for home health, typically have recommendations from the nurse and go from there. Not sure what they have been doing up until now, or if today was her first visit with patient.     Message forwarded tome at 16:46 on 1/31/23. Will send back to phone nurseto address first thing tomorrow morning.         Note       You routed conversation to Eunice Swann MD 20 hours ago (4:46 PM)     Rolando Leyva/[facility] [phone]  20 hours ago (4:38 PM)     CR  [facility] due to message left, currently not available message left for return call.    Outgoing call      You 20 hours ago (4:38 PM)     CR  Call placed to Effie Leyva/[facility] due to Dr. Sánchez's  asking can updated wo        Lisseth Sánchez,     Please review message below from nurse Effie Leyva from [facility] requesting new wound care instructions for this patient.  Thank you  Signed:  Ashley Hayes LPN  Call placed to Effie Leyva/[facility] due to message left, currently not available message left for return call.     -----  Message from Dina House sent at 1/31/2023  8:38 AM CST -----  Who Called:nurse kecia dominguez         What is the reqeust in detail: pt is requesting for new wound care instructions to be sent via fax [phone] , so she can tend to the pt today at 10am . Please advise         Can the clinic reply by [facility]? No

## 2023-02-05 PROBLEM — Z98.84 HISTORY OF ROUX-EN-Y GASTRIC BYPASS: Status: ACTIVE | Noted: 2023-02-05

## 2023-02-05 PROBLEM — E53.8 VITAMIN B12 DEFICIENCY: Status: ACTIVE | Noted: 2023-02-05

## 2023-02-05 PROBLEM — Z79.899 LONG-TERM USE OF HIGH-RISK MEDICATION: Status: ACTIVE | Noted: 2023-02-05

## 2023-02-05 PROBLEM — F41.9 ANXIETY: Status: ACTIVE | Noted: 2023-02-05

## 2023-02-22 ENCOUNTER — TELEPHONE (OUTPATIENT)
Dept: FAMILY MEDICINE | Facility: CLINIC | Age: 78
End: 2023-02-22
Payer: MEDICARE

## 2023-02-22 NOTE — TELEPHONE ENCOUNTER
----- Message from Steven Armstrong sent at 2/22/2023 12:04 PM CST -----  Contact: pt's daughter Kasia at  [phone]  Type: Needs Medical Advice  Who Called:  pt's daughter Kasia   Best Call Back Number: [phone]  Additional Information: pt's daughter is calling the office Kasia is calling the office requesting to speak to the nurse. Please call back and advise.    PER THE PT'S DAUGHTER KASIA SHE WOULD LIKE A CALL BACK TODAY

## 2023-02-24 ENCOUNTER — OFFICE VISIT (OUTPATIENT)
Dept: FAMILY MEDICINE | Facility: CLINIC | Age: 78
End: 2023-02-24
Payer: MEDICARE

## 2023-02-24 DIAGNOSIS — L03.119 CELLULITIS OF LOWER EXTREMITY, UNSPECIFIED LATERALITY: ICD-10-CM

## 2023-02-24 DIAGNOSIS — R41.3 MEMORY DIFFICULTIES: Primary | ICD-10-CM

## 2023-02-24 PROCEDURE — 99213 OFFICE O/P EST LOW 20 MIN: CPT | Mod: 95,,, | Performed by: FAMILY MEDICINE

## 2023-02-24 PROCEDURE — 99213 PR OFFICE/OUTPT VISIT, EST, LEVL III, 20-29 MIN: ICD-10-PCS | Mod: 95,,, | Performed by: FAMILY MEDICINE

## 2023-02-24 RX ORDER — CEPHALEXIN 500 MG/1
500 CAPSULE ORAL EVERY 12 HOURS
Qty: 14 CAPSULE | Refills: 0 | Status: SHIPPED | OUTPATIENT
Start: 2023-02-24 | End: 2023-03-03

## 2023-02-24 RX ORDER — RIVASTIGMINE 9.5 MG/24H
1 PATCH, EXTENDED RELEASE TRANSDERMAL DAILY
Qty: 30 PATCH | Refills: 11 | Status: SHIPPED | OUTPATIENT
Start: 2023-02-24 | End: 2024-02-24

## 2023-02-24 RX ORDER — ALPRAZOLAM 0.25 MG/1
0.25 TABLET ORAL 2 TIMES DAILY PRN
Qty: 20 TABLET | Refills: 1 | Status: SHIPPED | OUTPATIENT
Start: 2023-02-24 | End: 2023-03-24 | Stop reason: SDUPTHER

## 2023-02-24 NOTE — PROGRESS NOTES
Chief Complaint   Patient presents with    Leg Pain        HPI:  78 female seen at [facility] recently for worsening LE edema in setting of dementia- has been having LE edema and cellulitis via Dr. Patyon.  She's having some issues with removing her dressings    Daughter (who is a nurse) is reporting notable concern about cellulitis    ROS: as above      General:  AOx3, well nourished and developed in no acute distress  Eyes:  PERRLA, EOMI, vision intact grossly  Neck:  trachea midline with no masses or thyromegaly  Musculoskeletal:  Normal posture.  Normal muscular development.  Neurological:  CN II-XII grossly intact based off of video interaction  Psych:  Normal mood and affect.  Able to demonstrate good judgement and personal insight.    Legs severely blistered and erythematous      Assessment/Plan:    Memory difficulties    Cellulitis of lower extremity, unspecified laterality       Patient's daughter is very reliable advocate, and has good insight.  Patient diuresing well, but not tolerating wound care and dressing changes.  This is likely worsening mental status.  Will increase exelon patch, add short term anxiolytic, and treat for local infection.  Cephalosporin should be adequate to cover for local strep.    The patient location is: home  The chief complaint leading to consultation is: wound issues    Visit type: audiovisual    Face to Face time with patient: 15  15 minutes of total time spent on the encounter, which includes face to face time and non-face to face time preparing to see the patient (eg, review of tests), Obtaining and/or reviewing separately obtained history, Documenting clinical information in the electronic or other health record, Independently interpreting results (not separately reported) and communicating results to the patient/family/caregiver, or Care coordination (not separately reported).         Each patient to whom he or she provides medical services by telemedicine is:  (1)  informed of the relationship between the physician and patient and the respective role of any other health care provider with respect to management of the patient; and (2) notified that he or she may decline to receive medical services by telemedicine and may withdraw from such care at any time.    Notes:

## 2023-02-27 ENCOUNTER — DOCUMENT SCAN (OUTPATIENT)
Dept: HOME HEALTH SERVICES | Facility: HOSPITAL | Age: 78
End: 2023-02-27
Payer: MEDICARE

## 2023-03-04 ENCOUNTER — DOCUMENT SCAN (OUTPATIENT)
Dept: HOME HEALTH SERVICES | Facility: HOSPITAL | Age: 78
End: 2023-03-04
Payer: MEDICARE

## 2023-03-23 ENCOUNTER — TELEPHONE (OUTPATIENT)
Dept: FAMILY MEDICINE | Facility: CLINIC | Age: 78
End: 2023-03-23
Payer: MEDICARE

## 2023-03-23 NOTE — TELEPHONE ENCOUNTER
----- Message from Ian Agustin sent at 3/23/2023  9:19 AM CDT -----  Who Called: patient          What is the reqeust in detail: Requesting call back to discuss pt has only one xanax left and she is asking to be called in more before her appt, she states the xanax is very effective.         Can the clinic reply by [facility]? no         Best Call Back Number: [phone]           Additional Information:    [id number] [place] - [address]  [address] [place] [address]  Phone: [phone] Fax: [phone]

## 2023-03-24 ENCOUNTER — TELEPHONE (OUTPATIENT)
Dept: FAMILY MEDICINE | Facility: CLINIC | Age: 78
End: 2023-03-24
Payer: MEDICARE

## 2023-03-24 RX ORDER — ALPRAZOLAM 0.25 MG/1
0.25 TABLET ORAL 2 TIMES DAILY PRN
Qty: 20 TABLET | Refills: 1 | Status: SHIPPED | OUTPATIENT
Start: 2023-03-24 | End: 2023-04-05 | Stop reason: SDUPTHER

## 2023-03-24 NOTE — TELEPHONE ENCOUNTER
----- Message from Graham Diallo sent at 3/24/2023  2:10 PM CDT -----  Contact: Daughter  Type:  RX Refill Request    Who Called:  Daughter  Refill or New Rx:  refill  RX Name and Strength:  ALPRAZolam (XANAX) 0.25 MG tablet      How is the patient currently taking it? (ex. 1XDay):  as directed  Is this a 30 day or 90 day RX:  as directed  Preferred Pharmacy with phone number:    [id number] - [place], [place] - [address] [facility]  [address] [address]  Phone: [phone] Fax: [phone]      Local or Mail Order:  Local  Ordering Provider:  Angelica Khan Call Back Number:  [phone]    Additional Information:  Please advise

## 2023-04-05 ENCOUNTER — OFFICE VISIT (OUTPATIENT)
Dept: FAMILY MEDICINE | Facility: CLINIC | Age: 78
End: 2023-04-05
Payer: MEDICARE

## 2023-04-05 DIAGNOSIS — R60.9 DEPENDENT EDEMA: ICD-10-CM

## 2023-04-05 DIAGNOSIS — R41.3 MEMORY DIFFICULTIES: Primary | ICD-10-CM

## 2023-04-05 DIAGNOSIS — F41.9 ANXIETY: ICD-10-CM

## 2023-04-05 DIAGNOSIS — I87.8 VENOUS STASIS SYNDROME: ICD-10-CM

## 2023-04-05 PROCEDURE — 1160F RVW MEDS BY RX/DR IN RCRD: CPT | Mod: CPTII,95,, | Performed by: FAMILY MEDICINE

## 2023-04-05 PROCEDURE — 99214 PR OFFICE/OUTPT VISIT, EST, LEVL IV, 30-39 MIN: ICD-10-PCS | Mod: 95,,, | Performed by: FAMILY MEDICINE

## 2023-04-05 PROCEDURE — 1159F MED LIST DOCD IN RCRD: CPT | Mod: CPTII,95,, | Performed by: FAMILY MEDICINE

## 2023-04-05 PROCEDURE — 1159F PR MEDICATION LIST DOCUMENTED IN MEDICAL RECORD: ICD-10-PCS | Mod: CPTII,95,, | Performed by: FAMILY MEDICINE

## 2023-04-05 PROCEDURE — 1160F PR REVIEW ALL MEDS BY PRESCRIBER/CLIN PHARMACIST DOCUMENTED: ICD-10-PCS | Mod: CPTII,95,, | Performed by: FAMILY MEDICINE

## 2023-04-05 PROCEDURE — 99214 OFFICE O/P EST MOD 30 MIN: CPT | Mod: 95,,, | Performed by: FAMILY MEDICINE

## 2023-04-05 RX ORDER — FUROSEMIDE 40 MG/1
40 TABLET ORAL DAILY
Qty: 90 TABLET | Refills: 3 | Status: SHIPPED | OUTPATIENT
Start: 2023-04-05 | End: 2024-04-04

## 2023-04-05 RX ORDER — RIVASTIGMINE 13.3 MG/24H
1 PATCH, EXTENDED RELEASE TRANSDERMAL DAILY
Qty: 90 PATCH | Refills: 3 | Status: SHIPPED | OUTPATIENT
Start: 2023-04-05

## 2023-04-05 RX ORDER — ALPRAZOLAM 0.25 MG/1
0.25 TABLET ORAL 2 TIMES DAILY PRN
Qty: 30 TABLET | Refills: 2 | Status: SHIPPED | OUTPATIENT
Start: 2023-04-05 | End: 2023-07-04

## 2023-04-05 NOTE — PROGRESS NOTES
"The patient location is: [place]  The chief complaint leading to consultation is: [place]    Visit type: audiovisual    Face to Face time with patient: 22 minutes  30 minutes of total time spent on the encounter, which includes face to face time and non-face to face time preparing to see the patient (eg, review of tests), Obtaining and/or reviewing separately obtained history, Documenting clinical information in the electronic or other health record, Independently interpreting results (not separately reported) and communicating results to the patient/family/caregiver, or Care coordination (not separately reported).         Each patient to whom he or she provides medical services by telemedicine is:  (1) informed of the relationship between the physician and patient and the respective role of any other health care provider with respect to management of the patient; and (2) notified that he or she may decline to receive medical services by telemedicine and may withdraw from such care at any time.    Notes:       Subjective:       Patient ID: Yamilex Gaytan is a 78 y.o. female.    Chief Complaint: Follow-up    Daughter present for VV to assist with history.    HH d/c pt because pt wasn't compliant with keeping the dressings on. Still with LE edema, daughter is an RN and applying compression dressings every 3-4 days, as pt is leaving them alone at this point. All the blisters have dried up. Daughter applying betadine topically. Also was applying "green mesh" antimicrobial--Sorbact.    Dr. Dominguez Rx Xanax 0.25mg for anxiety, which has been helpful.     Exelon now at 9.5mg daily    Depression Patient Health Questionnaire 1/23/2023 8/3/2022 11/18/2021   Over the last two weeks how often have you been bothered by little interest or pleasure in doing things Not at all Not at all Not at all   Over the last two weeks how often have you been bothered by feeling down, depressed or hopeless Not at all Not at all Not at all   PHQ-2 Total " Score 0 0 0     No flowsheet data found.    Review of Systems   Constitutional:  Negative for activity change and unexpected weight change.   HENT:  Negative for hearing loss, rhinorrhea and trouble swallowing.    Eyes:  Negative for discharge and visual disturbance.   Respiratory:  Negative for chest tightness and wheezing.    Cardiovascular:  Negative for chest pain and palpitations.   Gastrointestinal:  Negative for blood in stool, constipation, diarrhea and vomiting.   Endocrine: Negative for polydipsia and polyuria.   Genitourinary:  Negative for difficulty urinating, dysuria, hematuria and menstrual problem.   Musculoskeletal:  Negative for arthralgias, joint swelling and neck pain.   Neurological:  Negative for weakness and headaches.   Psychiatric/Behavioral:  Positive for confusion. Negative for dysphoric mood.    All other systems reviewed and are negative.      Past Medical History:   Diagnosis Date    Dementia      Past Surgical History:   Procedure Laterality Date    ADENOIDECTOMY      bladder tacking      CHOLECYSTECTOMY      GASTRIC BYPASS      HYSTERECTOMY      TOTAL VAGINAL HYSTERECTOMY       Family History   Problem Relation Age of Onset    Heart disease Mother     Diabetes Mother     Cancer Father         throat cancer       Review of patient's allergies indicates:   Allergen Reactions    Codeine Itching and Other (See Comments)    Pyrilamine-phenylephrin-dm tan Itching    Adhesive Other (See Comments)    Adhesive tape-silicones        Current Outpatient Medications:     ALPRAZolam (XANAX) 0.25 MG tablet, Take 1 tablet (0.25 mg total) by mouth 2 (two) times daily as needed for Anxiety., Disp: 30 tablet, Rfl: 2    furosemide (LASIX) 40 MG tablet, Take 1 tablet (40 mg total) by mouth once daily., Disp: 90 tablet, Rfl: 3    paroxetine (PAXIL) 10 MG tablet, Take 1 tablet (10 mg total) by mouth every morning., Disp: 90 tablet, Rfl: 3    potassium chloride SA (K-DUR,KLOR-CON) 20 MEQ tablet, Take once  daily if needed for leg cramping, Disp: 90 tablet, Rfl: 0    rivastigmine (EXELON) 9.5 mg/24 hour PT24, Place 1 patch onto the skin once daily., Disp: 30 patch, Rfl: 11    rivastigmine 13.3 mg/24 hour PT24, Place 1 patch onto the skin Daily., Disp: 90 patch, Rfl: 3    UNABLE TO FIND, medication name: CBD Gummy, Disp: , Rfl:       Objective:      There were no vitals taken for this visit.  Physical Exam  Constitutional:       General: She is not in acute distress.  Pulmonary:      Effort: Pulmonary effort is normal. No respiratory distress.   Neurological:      Mental Status: She is alert and oriented to person, place, and time.   Psychiatric:      Comments: Mood is flat       Assessment:       1. Memory difficulties    2. Venous stasis syndrome    3. Dependent edema    4. Anxiety        Plan:       Memory difficulties  -     rivastigmine 13.3 mg/24 hour PT24; Place 1 patch onto the skin Daily.  Dispense: 90 patch; Refill: 3    Venous stasis syndrome  -     furosemide (LASIX) 40 MG tablet; Take 1 tablet (40 mg total) by mouth once daily.  Dispense: 90 tablet; Refill: 3    Dependent edema  -     furosemide (LASIX) 40 MG tablet; Take 1 tablet (40 mg total) by mouth once daily.  Dispense: 90 tablet; Refill: 3    Anxiety  -     ALPRAZolam (XANAX) 0.25 MG tablet; Take 1 tablet (0.25 mg total) by mouth 2 (two) times daily as needed for Anxiety.  Dispense: 30 tablet; Refill: 2          Will increase her Lasix dose from 20 mg to 40 mg daily.  Daughter will transition from compression wraps a couple of times weekly over to daily compression stockings or socks to help with dependent edema.  We will also increase her Exelon patch from 9.5-13.3 mg every 24 hours.  She is doing well on the low-dose Xanax as needed for anxiety, refill will be sent to pharmacy.        Previous records in Epic were reviewed, including the last 3 months of encounters, imaging, laboratory, and pathology reports.    Strict return precautions reviewed  and patient verbalized understanding. Risks, benefits, and alternatives to the plan were reviewed in detail and all questions answered to the patient's satisfaction. Patient agrees to return to clinic or ER if symptoms worsen. 30 minutes total were spent on today's visit, not limited to but including time based on counseling and coordination of care.    Patient instructed that best way to communicate with my office staff is for patient to get on the [facility] epic patient portal to expedite communication and communication issues that may occur.  Patient was given instructions on how to get on the portal.  I encouraged patient to obtain portal access as well.  Ultimately it is up to the patient to obtain access.  Patient voiced understanding.    This note was created using [employer] voice recognition software that occasionally may misinterpret phrases or words.    Follow up in about 3 months (around 7/5/2023) for f/u and med refills (increased dose of Exelon patch, Xanax for anxiety).

## 2023-04-16 PROBLEM — R60.9 DEPENDENT EDEMA: Status: ACTIVE | Noted: 2023-04-16

## 2023-04-16 PROBLEM — I87.8 VENOUS STASIS SYNDROME: Status: ACTIVE | Noted: 2023-04-16

## 2023-07-03 DIAGNOSIS — F41.9 ANXIETY: ICD-10-CM

## 2023-07-04 RX ORDER — ALPRAZOLAM 0.25 MG/1
0.25 TABLET ORAL 2 TIMES DAILY PRN
Qty: 30 TABLET | Refills: 2 | Status: SHIPPED | OUTPATIENT
Start: 2023-07-04 | End: 2023-08-03

## 2023-07-04 NOTE — TELEPHONE ENCOUNTER
After reviewing la monitoring program, no aberrant behaviour noted.   #30 and 2rf sent to pharmacy.  Last OV in April, and patient will need follow up for controlled med refills. Please schedule follow up before 90 days.

## 2023-08-01 ENCOUNTER — TELEPHONE (OUTPATIENT)
Dept: FAMILY MEDICINE | Facility: CLINIC | Age: 78
End: 2023-08-01
Payer: MEDICARE

## 2023-08-01 DIAGNOSIS — F03.90 DEMENTIA, UNSPECIFIED DEMENTIA SEVERITY, UNSPECIFIED DEMENTIA TYPE, UNSPECIFIED WHETHER BEHAVIORAL, PSYCHOTIC, OR MOOD DISTURBANCE OR ANXIETY: Primary | ICD-10-CM

## 2023-08-01 DIAGNOSIS — N18.1 TYPE 2 DIABETES MELLITUS WITH STAGE 1 CHRONIC KIDNEY DISEASE, WITHOUT LONG-TERM CURRENT USE OF INSULIN: ICD-10-CM

## 2023-08-01 DIAGNOSIS — I87.8 VENOUS STASIS SYNDROME: ICD-10-CM

## 2023-08-01 DIAGNOSIS — E11.22 TYPE 2 DIABETES MELLITUS WITH STAGE 1 CHRONIC KIDNEY DISEASE, WITHOUT LONG-TERM CURRENT USE OF INSULIN: ICD-10-CM

## 2023-08-01 DIAGNOSIS — R53.81 DEBILITY: ICD-10-CM

## 2023-08-01 NOTE — TELEPHONE ENCOUNTER
Lisseth Sánchez,   Please review message below.  Call placed to patient's daughter due to message left, state patient is not eating, total care, able to turn over with assistance, but can only sit up for a short period. Calling back in regarding hospice services for comfort measures.  Thank you.  Signed:  YOVANY Ball, Patient Care Assistant  TERESA Dawson Staff  Caller: Kasia pt's daughter (Today,  2:46 PM)  Type: Needs Medical Advice     Who Called:  Kasia pt's daughter     Best Call Back Number: [phone] (home)     Additional Information: Kasia pt's daughter states she would like a callback regarding hospice for the pt. Please call to advise. Thanks!

## 2023-08-01 NOTE — TELEPHONE ENCOUNTER
----- Message from Dominga White, Patient Care Assistant sent at 8/1/2023  2:46 PM CDT -----  Regarding: advice  Contact: Kasia white's daughter  Type: Needs Medical Advice    Who Called:  Kasia white's daughter    Best Call Back Number: [phone] (home)     Additional Information: Kasia white's daughter states she would like a callback regarding hospice for the pt. Please call to advise. Thanks!

## 2023-08-02 ENCOUNTER — PATIENT MESSAGE (OUTPATIENT)
Dept: PRIMARY CARE CLINIC | Facility: CLINIC | Age: 78
End: 2023-08-02
Payer: MEDICARE

## 2023-08-07 ENCOUNTER — TELEPHONE (OUTPATIENT)
Dept: FAMILY MEDICINE | Facility: CLINIC | Age: 78
End: 2023-08-07
Payer: MEDICARE

## 2023-08-07 DIAGNOSIS — R53.81 DEBILITY: ICD-10-CM

## 2023-08-07 DIAGNOSIS — G31.1 SENILE DEGENERATION OF BRAIN: Primary | ICD-10-CM

## 2023-08-07 DIAGNOSIS — L97.909 CHRONIC CUTANEOUS VENOUS STASIS ULCER: ICD-10-CM

## 2023-08-07 DIAGNOSIS — Z98.84 HISTORY OF ROUX-EN-Y GASTRIC BYPASS: ICD-10-CM

## 2023-08-07 DIAGNOSIS — I83.009 CHRONIC CUTANEOUS VENOUS STASIS ULCER: ICD-10-CM

## 2023-08-07 DIAGNOSIS — N18.1 TYPE 2 DIABETES MELLITUS WITH STAGE 1 CHRONIC KIDNEY DISEASE, WITHOUT LONG-TERM CURRENT USE OF INSULIN: ICD-10-CM

## 2023-08-07 DIAGNOSIS — I87.8 VENOUS STASIS SYNDROME: ICD-10-CM

## 2023-08-07 DIAGNOSIS — R60.9 DEPENDENT EDEMA: ICD-10-CM

## 2023-08-07 DIAGNOSIS — E11.22 TYPE 2 DIABETES MELLITUS WITH STAGE 1 CHRONIC KIDNEY DISEASE, WITHOUT LONG-TERM CURRENT USE OF INSULIN: ICD-10-CM

## 2023-08-07 DIAGNOSIS — F41.9 ANXIETY: ICD-10-CM

## 2023-08-07 NOTE — TELEPHONE ENCOUNTER
Updated diagnoses added to record. New hospice consult signed. Please reach out to patient's daughter as well as hospice team (I initially had requested [facility], but not documented in the phone note below.)    Please document conversation with daughter as well as document conversation with [facility]/fax order.    Thank you!

## 2023-08-07 NOTE — TELEPHONE ENCOUNTER
Lisseth Sánchez,  Please review message below.  Call placed to patient's daughter, states Dr. Sánchez order hospice for her mother, and hospice came in this am but are unable to do an evaluation due to diagnosis is listed as dementia. 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  Please review and advise.  Thank you.  Signed:  Ashley Hayes LPN      ----- Message from Cristina Ying sent at 8/7/2023  1:03 PM CDT -----  Contact: negar dietrich  Type: Needs Medical Advice  Who Called:  pt daughter karlo  Symptoms (please be specific):  n/a  How long has patient had these symptoms:  n/a  Pharmacy name and phone #:  n/a  Best Call Back Number: [phone]  Additional Information: dr patricia ordered hospice but it cannot be done under the diagnoses of    Senile to generation of the brain.please call at you earliest convince I would like to discuss

## 2023-08-08 NOTE — TELEPHONE ENCOUNTER
Attila Fung from [facility] came by the office today and was informed of new hospice order faxed.

## 2023-09-21 DIAGNOSIS — Z78.0 MENOPAUSE: ICD-10-CM

## 2024-07-09 ENCOUNTER — PATIENT MESSAGE (OUTPATIENT)
Dept: ADMINISTRATIVE | Facility: HOSPITAL | Age: 79
End: 2024-07-09
Payer: MEDICARE